# Patient Record
Sex: FEMALE | Race: WHITE | Employment: UNEMPLOYED | URBAN - METROPOLITAN AREA
[De-identification: names, ages, dates, MRNs, and addresses within clinical notes are randomized per-mention and may not be internally consistent; named-entity substitution may affect disease eponyms.]

---
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
## 2019-06-18 ENCOUNTER — HOSPITAL ENCOUNTER (EMERGENCY)
Age: 19
Discharge: ANOTHER HOSPITAL | End: 2019-06-19
Attending: EMERGENCY MEDICINE
Payer: OTHER GOVERNMENT

## 2019-06-18 ENCOUNTER — APPOINTMENT (OUTPATIENT)
Dept: CT IMAGING | Age: 19
End: 2019-06-18
Attending: EMERGENCY MEDICINE
Payer: OTHER GOVERNMENT

## 2019-06-18 DIAGNOSIS — R44.3 HALLUCINATIONS: Primary | ICD-10-CM

## 2019-06-18 DIAGNOSIS — R45.851 SUICIDAL IDEATION: ICD-10-CM

## 2019-06-18 LAB
AMPHET UR QL SCN: NEGATIVE
ANION GAP SERPL CALC-SCNC: 9 MMOL/L (ref 3–18)
APAP SERPL-MCNC: <2 UG/ML (ref 10–30)
APPEARANCE UR: ABNORMAL
BACTERIA URNS QL MICRO: ABNORMAL /HPF
BARBITURATES UR QL SCN: NEGATIVE
BASOPHILS # BLD: 0 K/UL (ref 0–0.1)
BASOPHILS NFR BLD: 0 % (ref 0–2)
BENZODIAZ UR QL: NEGATIVE
BILIRUB UR QL: NEGATIVE
BUN SERPL-MCNC: 15 MG/DL (ref 7–18)
BUN/CREAT SERPL: 13 (ref 12–20)
CALCIUM SERPL-MCNC: 8.8 MG/DL (ref 8.5–10.1)
CANNABINOIDS UR QL SCN: NEGATIVE
CHLORIDE SERPL-SCNC: 109 MMOL/L (ref 100–108)
CO2 SERPL-SCNC: 26 MMOL/L (ref 21–32)
COCAINE UR QL SCN: NEGATIVE
COLOR UR: YELLOW
CREAT SERPL-MCNC: 1.14 MG/DL (ref 0.6–1.3)
DIFFERENTIAL METHOD BLD: NORMAL
EOSINOPHIL # BLD: 0.1 K/UL (ref 0–0.4)
EOSINOPHIL NFR BLD: 2 % (ref 0–5)
EPITH CASTS URNS QL MICRO: ABNORMAL /LPF (ref 0–5)
ERYTHROCYTE [DISTWIDTH] IN BLOOD BY AUTOMATED COUNT: 11.7 % (ref 11.6–14.5)
ETHANOL SERPL-MCNC: <3 MG/DL (ref 0–3)
GLUCOSE SERPL-MCNC: 124 MG/DL (ref 74–99)
GLUCOSE UR STRIP.AUTO-MCNC: NEGATIVE MG/DL
HCG UR QL: NEGATIVE
HCT VFR BLD AUTO: 38.9 % (ref 35–45)
HDSCOM,HDSCOM: NORMAL
HGB BLD-MCNC: 13.3 G/DL (ref 12–16)
HGB UR QL STRIP: NEGATIVE
KETONES UR QL STRIP.AUTO: NEGATIVE MG/DL
LEUKOCYTE ESTERASE UR QL STRIP.AUTO: ABNORMAL
LYMPHOCYTES # BLD: 3.1 K/UL (ref 0.9–3.6)
LYMPHOCYTES NFR BLD: 46 % (ref 21–52)
MCH RBC QN AUTO: 31.1 PG (ref 24–34)
MCHC RBC AUTO-ENTMCNC: 34.2 G/DL (ref 31–37)
MCV RBC AUTO: 90.9 FL (ref 74–97)
METHADONE UR QL: NEGATIVE
MONOCYTES # BLD: 0.5 K/UL (ref 0.05–1.2)
MONOCYTES NFR BLD: 7 % (ref 3–10)
NEUTS SEG # BLD: 3.1 K/UL (ref 1.8–8)
NEUTS SEG NFR BLD: 45 % (ref 40–73)
NITRITE UR QL STRIP.AUTO: NEGATIVE
OPIATES UR QL: NEGATIVE
PCP UR QL: NEGATIVE
PH UR STRIP: 5 [PH] (ref 5–8)
PLATELET # BLD AUTO: 248 K/UL (ref 135–420)
PMV BLD AUTO: 10.2 FL (ref 9.2–11.8)
POTASSIUM SERPL-SCNC: 4 MMOL/L (ref 3.5–5.5)
PROT UR STRIP-MCNC: NEGATIVE MG/DL
RBC # BLD AUTO: 4.28 M/UL (ref 4.2–5.3)
RBC #/AREA URNS HPF: NEGATIVE /HPF (ref 0–5)
SALICYLATES SERPL-MCNC: <1.7 MG/DL (ref 2.8–20)
SODIUM SERPL-SCNC: 144 MMOL/L (ref 136–145)
SP GR UR REFRACTOMETRY: 1.02 (ref 1–1.03)
UROBILINOGEN UR QL STRIP.AUTO: 1 EU/DL (ref 0.2–1)
WBC # BLD AUTO: 6.8 K/UL (ref 4.6–13.2)
WBC URNS QL MICRO: ABNORMAL /HPF (ref 0–5)

## 2019-06-18 PROCEDURE — 70450 CT HEAD/BRAIN W/O DYE: CPT

## 2019-06-18 PROCEDURE — 80048 BASIC METABOLIC PNL TOTAL CA: CPT

## 2019-06-18 PROCEDURE — 85025 COMPLETE CBC W/AUTO DIFF WBC: CPT

## 2019-06-18 PROCEDURE — 81001 URINALYSIS AUTO W/SCOPE: CPT

## 2019-06-18 PROCEDURE — 81025 URINE PREGNANCY TEST: CPT

## 2019-06-18 PROCEDURE — 99285 EMERGENCY DEPT VISIT HI MDM: CPT

## 2019-06-18 PROCEDURE — 80307 DRUG TEST PRSMV CHEM ANLYZR: CPT

## 2019-06-18 RX ORDER — FLUOXETINE HYDROCHLORIDE 20 MG/1
20 CAPSULE ORAL
Status: ON HOLD | COMMUNITY
End: 2019-06-21 | Stop reason: SDUPTHER

## 2019-06-18 RX ORDER — ESCITALOPRAM OXALATE 20 MG/1
20 TABLET ORAL DAILY
Status: ON HOLD | COMMUNITY
End: 2019-06-20

## 2019-06-18 RX ORDER — TRAZODONE HYDROCHLORIDE 100 MG/1
100 TABLET ORAL
Status: ON HOLD | COMMUNITY
End: 2019-06-20

## 2019-06-18 RX ORDER — BUTALBITAL, ACETAMINOPHEN AND CAFFEINE 50; 325; 40 MG/1; MG/1; MG/1
1 TABLET ORAL
Status: DISPENSED | OUTPATIENT
Start: 2019-06-18 | End: 2019-06-19

## 2019-06-19 ENCOUNTER — HOSPITAL ENCOUNTER (INPATIENT)
Age: 19
LOS: 2 days | Discharge: HOME | DRG: 885 | End: 2019-06-21
Attending: PSYCHIATRY & NEUROLOGY | Admitting: PSYCHIATRY & NEUROLOGY
Payer: OTHER GOVERNMENT

## 2019-06-19 VITALS
OXYGEN SATURATION: 98 % | RESPIRATION RATE: 16 BRPM | WEIGHT: 185 LBS | DIASTOLIC BLOOD PRESSURE: 55 MMHG | BODY MASS INDEX: 27.4 KG/M2 | HEIGHT: 69 IN | HEART RATE: 86 BPM | TEMPERATURE: 97.9 F | SYSTOLIC BLOOD PRESSURE: 114 MMHG

## 2019-06-19 PROBLEM — F32.A DEPRESSION: Status: ACTIVE | Noted: 2019-06-19

## 2019-06-19 PROCEDURE — 65220000003 HC RM SEMIPRIVATE PSYCH

## 2019-06-19 PROCEDURE — 74011250637 HC RX REV CODE- 250/637: Performed by: PSYCHIATRY & NEUROLOGY

## 2019-06-19 RX ORDER — FLUOXETINE HYDROCHLORIDE 20 MG/1
20 CAPSULE ORAL DAILY
Status: DISCONTINUED | OUTPATIENT
Start: 2019-06-19 | End: 2019-06-19 | Stop reason: HOSPADM

## 2019-06-19 RX ORDER — BENZTROPINE MESYLATE 1 MG/ML
2 INJECTION INTRAMUSCULAR; INTRAVENOUS
Status: DISCONTINUED | OUTPATIENT
Start: 2019-06-19 | End: 2019-06-21 | Stop reason: HOSPADM

## 2019-06-19 RX ORDER — IBUPROFEN 400 MG/1
400 TABLET ORAL
Status: DISCONTINUED | OUTPATIENT
Start: 2019-06-19 | End: 2019-06-21 | Stop reason: HOSPADM

## 2019-06-19 RX ORDER — TRAZODONE HYDROCHLORIDE 50 MG/1
50 TABLET ORAL
Status: DISCONTINUED | OUTPATIENT
Start: 2019-06-19 | End: 2019-06-21 | Stop reason: HOSPADM

## 2019-06-19 RX ORDER — LORAZEPAM 2 MG/ML
2 INJECTION INTRAMUSCULAR
Status: DISCONTINUED | OUTPATIENT
Start: 2019-06-19 | End: 2019-06-21 | Stop reason: HOSPADM

## 2019-06-19 RX ORDER — BENZTROPINE MESYLATE 2 MG/1
2 TABLET ORAL
Status: DISCONTINUED | OUTPATIENT
Start: 2019-06-19 | End: 2019-06-21 | Stop reason: HOSPADM

## 2019-06-19 RX ORDER — ACETAMINOPHEN 325 MG/1
650 TABLET ORAL
Status: DISCONTINUED | OUTPATIENT
Start: 2019-06-19 | End: 2019-06-21 | Stop reason: HOSPADM

## 2019-06-19 RX ORDER — ESCITALOPRAM OXALATE 10 MG/1
20 TABLET ORAL DAILY
Status: DISCONTINUED | OUTPATIENT
Start: 2019-06-19 | End: 2019-06-19 | Stop reason: HOSPADM

## 2019-06-19 RX ORDER — ADHESIVE BANDAGE
30 BANDAGE TOPICAL DAILY PRN
Status: DISCONTINUED | OUTPATIENT
Start: 2019-06-19 | End: 2019-06-21 | Stop reason: HOSPADM

## 2019-06-19 RX ORDER — OLANZAPINE 5 MG/1
5 TABLET ORAL
Status: DISCONTINUED | OUTPATIENT
Start: 2019-06-19 | End: 2019-06-21 | Stop reason: HOSPADM

## 2019-06-19 RX ORDER — HYDROXYZINE 25 MG/1
50 TABLET, FILM COATED ORAL
Status: DISCONTINUED | OUTPATIENT
Start: 2019-06-19 | End: 2019-06-21 | Stop reason: HOSPADM

## 2019-06-19 RX ORDER — IBUPROFEN 200 MG
1 TABLET ORAL
Status: DISCONTINUED | OUTPATIENT
Start: 2019-06-19 | End: 2019-06-21 | Stop reason: HOSPADM

## 2019-06-19 RX ADMIN — TRAZODONE HYDROCHLORIDE 50 MG: 50 TABLET ORAL at 22:08

## 2019-06-19 NOTE — ED PROVIDER NOTES
EMERGENCY DEPARTMENT HISTORY AND PHYSICAL EXAM    Date: 6/18/2019  Patient Name: Lily Montiel    History of Presenting Illness     Chief Complaint   Patient presents with    Mental Health Problem         History Provided By: Patient    Additional History (Context):   Lily Montiel is a 20 y.o. female with PMHX of depression, prior suicidal ideations, admitted to a psychiatric hospital 3 weeks ago presents to the emergency department C/O visual and auditory hallucinations that started at 3 PM.  States that the visual hallucinations involve \"things crawling out of the walls\". States that the visual hallucinations since has improved. Also reports auditory hallucinations. She is unsure if it is a male or a woman talking to her. Reports that they are telling her to \"kill myself\". She is concerned that the hallucinations are due to her starting her psychiatric medications including trazodone, Lexapro, Zyprexa . patient also reports generalized headache over last week. States that she has not tried anything for her headache. Pt denies numbness, weakness, blurry vision, and any other sxs or complaints. PCP: Manolo Garber MD    Current Outpatient Medications   Medication Sig Dispense Refill    FLUoxetine (PROZAC) 20 mg capsule Take 20 mg by mouth daily.  escitalopram oxalate (LEXAPRO) 20 mg tablet Take 20 mg by mouth daily.  traZODone (DESYREL) 100 mg tablet Take 100 mg by mouth nightly. Past History     Past Medical History:  Past Medical History:   Diagnosis Date    Anxiety     Asthma     As a child, states no longer active    Depression        Past Surgical History:  Past Surgical History:   Procedure Laterality Date    HX ADENOIDECTOMY      MYRINGOPLASTY         Family History:  History reviewed. No pertinent family history.     Social History:  Social History     Tobacco Use    Smoking status: Never Smoker    Smokeless tobacco: Never Used   Substance Use Topics    Alcohol use: No    Drug use: No       Allergies:  No Known Allergies      Review of Systems   Review of Systems   Constitutional: Negative for chills and fever. HENT: Negative for congestion, ear pain, sinus pain and sore throat. Eyes: Negative for pain and visual disturbance. Respiratory: Negative for cough and shortness of breath. Cardiovascular: Negative for chest pain and leg swelling. Gastrointestinal: Negative for abdominal pain, constipation, diarrhea, nausea and vomiting. Genitourinary: Negative for dysuria, hematuria, vaginal bleeding and vaginal discharge. Musculoskeletal: Negative for back pain and neck pain. Skin: Negative for rash and wound. Neurological: Positive for headaches. Negative for dizziness, tremors, weakness, light-headedness and numbness. Psychiatric/Behavioral: Positive for hallucinations and suicidal ideas. All other systems reviewed and are negative.       Physical Exam     Vitals:    06/18/19 1951   BP: 120/54   Pulse: 86   Resp: 18   Temp: 98.4 °F (36.9 °C)   SpO2: 100%   Weight: 83.9 kg (185 lb)   Height: 5' 9\" (1.753 m)     Physical Exam    Nursing note and vitals reviewed    Constitutional: Well appearing young  female, no acute distress  Head: Normocephalic, Atraumatic  Eyes: Pupils are equal, round, and reactive to light, EOMI  Neck: Supple, non-tender  Cardiovascular: Regular rate and rhythm, no murmurs, rubs, or gallops  Chest: Normal work of breathing and chest excursion bilaterally  Lungs: Clear to ausculation bilaterally, no wheezes, no rhonchi  Abdomen: Soft, non tender, non distended, normoactive bowel sounds  Back: No evidence of trauma or deformity  Extremities: No evidence of trauma or deformity, no LE edema  Skin: Warm and dry, normal cap refill  Neuro: Alert and appropriate, CN intact, normal speech, moving all 4 extremities freely and symmetrically  Psychiatric: Normal mood and affect       Diagnostic Study Results     Labs -   No results found for this or any previous visit (from the past 12 hour(s)). Radiologic Studies -   No orders to display     CT Results  (Last 48 hours)    None        CXR Results  (Last 48 hours)    None            Medical Decision Making   I am the first provider for this patient. I reviewed the vital signs, available nursing notes, past medical history, past surgical history, family history and social history. Vital Signs-Reviewed the patient's vital signs. Pulse Oximetry Analysis -100 % on room air    Records Reviewed: Nursing Notes and Old Medical Records    Provider Notes:   20 y.o. female presenting with visual and auditory hallucinations. On exam, patient exhibited appropriate VS, non toxic and NAD. Will obtain screening labwork including UDS, ethyl alcohol and consult psych for further recommendations. Due to her recent headache, and unusual visual hallucinations, will obtain a CT scan of the head. We will treat her headache symptomatically. Procedures:  Procedures    ED Course:   8:02 PM   Initial assessment performed. The patients presenting problems have been discussed, and they are in agreement with the care plan formulated and outlined with them. I have encouraged them to ask questions as they arise throughout their visit. ED Course as of Jun 19 0151   Tue Jun 18, 2019   2116 Patient medically cleared. UA not consistent with UTI. CT scan showing incidental finding of arachnoid cyst.  No acute process. Will consult case management. [CA]      ED Course User Index  [CA] Monie Ching,      7:00 AM  Patient's presentation, labs/imaging and plan of care was reviewed with Dr. Edu Arce as part of sign out. They will review case management placement as part of the plan discussed with the patient. Dr. Edu Arce MD's assistance in completion of this plan is greatly appreciated but it should be noted that I will be the provider of record for this patient.     8:35 AM  Patient reports that she currently is no longer having hallucinations and she is resting comfortably in her bed    9:06 AM  Updated patient on plan for transfer to [facility].  All questions answered. Patient understands and agrees with this plan. Diagnosis and Disposition     7:00 AM  I have spent 40 minutes of critical care time involved in lab review, consultations with specialist, family decision-making, and documentation. During this entire length of time I was immediately available to the patient. Critical Care: The reason for providing this level of medical care for this critically ill patient was due a critical illness that impaired one or more vital organ systems such that there was a high probability of imminent or life threatening deterioration in the patients condition. This care involved high complexity decision making to assess, manipulate, and support vital system functions, to treat this degreee vital organ system failure and to prevent further life threatening deterioration of the patients condition. TRANSFER PROGRESS NOTE:    Discussed impending transfer with Patient and/or family. Pt and/or family instructed that Pt would be transferred to [facility].  Discussed reasoning for transfer and future treatment plan. Family and Pt understands and agrees with care plan.   Written by Rae Ching DO,    Labs Reviewed   URINALYSIS W/ RFLX MICROSCOPIC - Abnormal; Notable for the following components:       Result Value    Leukocyte Esterase SMALL (*)     All other components within normal limits   METABOLIC PANEL, BASIC - Abnormal; Notable for the following components:    Chloride 109 (*)     Glucose 124 (*)     All other components within normal limits   ACETAMINOPHEN - Abnormal; Notable for the following components:    Acetaminophen level <2 (*)     All other components within normal limits   SALICYLATE - Abnormal; Notable for the following components: Salicylate level <5.7 (*)     All other components within normal limits   URINE MICROSCOPIC ONLY - Abnormal; Notable for the following components:    Bacteria 1+ (*)     All other components within normal limits   DRUG SCREEN, URINE   CBC WITH AUTOMATED DIFF   ETHYL ALCOHOL   HCG URINE, QL. - POC       Recent Results (from the past 12 hour(s))   URINALYSIS W/ RFLX MICROSCOPIC    Collection Time: 06/18/19  8:29 PM   Result Value Ref Range    Color YELLOW      Appearance CLOUDY      Specific gravity 1.024 1.005 - 1.030      pH (UA) 5.0 5.0 - 8.0      Protein NEGATIVE  NEG mg/dL    Glucose NEGATIVE  NEG mg/dL    Ketone NEGATIVE  NEG mg/dL    Bilirubin NEGATIVE  NEG      Blood NEGATIVE  NEG      Urobilinogen 1.0 0.2 - 1.0 EU/dL    Nitrites NEGATIVE  NEG      Leukocyte Esterase SMALL (A) NEG     DRUG SCREEN, URINE    Collection Time: 06/18/19  8:29 PM   Result Value Ref Range    BENZODIAZEPINES NEGATIVE  NEG      BARBITURATES NEGATIVE  NEG      THC (TH-CANNABINOL) NEGATIVE  NEG      OPIATES NEGATIVE  NEG      PCP(PHENCYCLIDINE) NEGATIVE  NEG      COCAINE NEGATIVE  NEG      AMPHETAMINES NEGATIVE  NEG      METHADONE NEGATIVE  NEG      HDSCOM (NOTE)    URINE MICROSCOPIC ONLY    Collection Time: 06/18/19  8:29 PM   Result Value Ref Range    WBC 0 to 3 0 - 5 /hpf    RBC NEGATIVE  0 - 5 /hpf    Epithelial cells 1+ 0 - 5 /lpf    Bacteria 1+ (A) NEG /hpf   CBC WITH AUTOMATED DIFF    Collection Time: 06/18/19  8:30 PM   Result Value Ref Range    WBC 6.8 4.6 - 13.2 K/uL    RBC 4.28 4.20 - 5.30 M/uL    HGB 13.3 12.0 - 16.0 g/dL    HCT 38.9 35.0 - 45.0 %    MCV 90.9 74.0 - 97.0 FL    MCH 31.1 24.0 - 34.0 PG    MCHC 34.2 31.0 - 37.0 g/dL    RDW 11.7 11.6 - 14.5 %    PLATELET 901 078 - 769 K/uL    MPV 10.2 9.2 - 11.8 FL    NEUTROPHILS 45 40 - 73 %    LYMPHOCYTES 46 21 - 52 %    MONOCYTES 7 3 - 10 %    EOSINOPHILS 2 0 - 5 %    BASOPHILS 0 0 - 2 %    ABS. NEUTROPHILS 3.1 1.8 - 8.0 K/UL    ABS. LYMPHOCYTES 3.1 0.9 - 3.6 K/UL    ABS. MONOCYTES 0.5 0.05 - 1.2 K/UL    ABS. EOSINOPHILS 0.1 0.0 - 0.4 K/UL    ABS. BASOPHILS 0.0 0.0 - 0.1 K/UL    DF AUTOMATED     METABOLIC PANEL, BASIC    Collection Time: 06/18/19  8:30 PM   Result Value Ref Range    Sodium 144 136 - 145 mmol/L    Potassium 4.0 3.5 - 5.5 mmol/L    Chloride 109 (H) 100 - 108 mmol/L    CO2 26 21 - 32 mmol/L    Anion gap 9 3.0 - 18 mmol/L    Glucose 124 (H) 74 - 99 mg/dL    BUN 15 7.0 - 18 MG/DL    Creatinine 1.14 0.6 - 1.3 MG/DL    BUN/Creatinine ratio 13 12 - 20      GFR est AA >60 >60 ml/min/1.73m2    GFR est non-AA >60 >60 ml/min/1.73m2    Calcium 8.8 8.5 - 10.1 MG/DL   ACETAMINOPHEN    Collection Time: 06/18/19  8:30 PM   Result Value Ref Range    Acetaminophen level <2 (L) 10.0 - 23.5 ug/mL   SALICYLATE    Collection Time: 06/18/19  8:30 PM   Result Value Ref Range    Salicylate level <7.5 (L) 2.8 - 20.0 MG/DL   ETHYL ALCOHOL    Collection Time: 06/18/19  8:30 PM   Result Value Ref Range    ALCOHOL(ETHYL),SERUM <3 0 - 3 MG/DL   HCG URINE, QL. - POC    Collection Time: 06/18/19  8:45 PM   Result Value Ref Range    Pregnancy test,urine (POC) NEGATIVE  NEG         CLINICAL IMPRESSION    1. Hallucinations    2. Suicidal ideation              ____________________________________     Please note that this dictation was completed with [employer], the computer voice recognition software. Quite often unanticipated grammatical, syntax, homophones, and other interpretive errors are inadvertently transcribed by the computer software. Please disregard these errors. Please excuse any errors that have escaped final proofreading.

## 2019-06-19 NOTE — ED NOTES
Pt resting in bed comfortably, lights dimmed to encourage relaxation  Call bell within reach  Pt hourly rounding competed. Safety   Pt (*) resting on stretcher with side rails up and call bell in reach. () in chair    () in parents arms. Toileting   Pt offered ()Bedpan     (*)Assistance to Restroom     ()Urinal  Ongoing Updates  Updated on plan of care and status of test results.   Pain Management  Inquired as to comfort and offered comfort measures:    (*) warm blankets   (*) dimmed lights

## 2019-06-19 NOTE — H&P
History and Physical    Subjective:     Omar Garcia is a 20 y.o. female with past medical hx significant for Asthma which is nonpersistent, anxiety, Depression. Per ER documentation, Pt admitted to a psychiatric hospital 3 weeks ago presents to the emergency department C/O visual and auditory hallucinations that started at 3 PM.  States that the visual hallucinations involve \"things crawling out of the walls\". States that the visual hallucinations since has improved. Also reports auditory hallucinations. She is unsure if it is a male or a woman talking to her. Reports that they are telling her to \"kill myself\". She is concerned that the hallucinations are due to her starting her psychiatric medications including trazodone, Lexapro, Zyprexa . patient also reports generalized headache over last week. States that she has not tried anything for her headache. Pt denies numbness, weakness, blurry vision, and any other sxs or complaints. Pt denies any complaints. Showing no signs of acute distress. Pt is c/o headache which she calls cluster headaches. Past Medical History:   Diagnosis Date    Anxiety     Asthma     As a child, states no longer active    Depression       Past Surgical History:   Procedure Laterality Date    HX ADENOIDECTOMY      MYRINGOPLASTY       FHX:HTN, Breast cancer    Social History     Tobacco Use    Smoking status: Never Smoker    Smokeless tobacco: Never Used   Substance Use Topics    Alcohol use: No       Prior to Admission medications    Medication Sig Start Date End Date Taking? Authorizing Provider   FLUoxetine (PROZAC) 20 mg capsule Take 20 mg by mouth daily. Manolo Garber MD   escitalopram oxalate (LEXAPRO) 20 mg tablet Take 20 mg by mouth daily. Manolo Garber MD   traZODone (DESYREL) 100 mg tablet Take 100 mg by mouth nightly.     Manolo Garber MD     No Known Allergies     Review of Systems:  Constitutional: negative  Eyes: negative  Ears, Nose, Mouth, Throat, and Face: negative  Respiratory: negative  Cardiovascular: negative  Gastrointestinal: negative  Genitourinary:negative  Integument/Breast: negative  Hematologic/Lymphatic: negative  Musculoskeletal:negative  Neurological: Headache  Behavioral/Psychiatric:depression. Endocrine: negative  Allergic/Immunologic: negative     Objective: Intake and Output:    No intake/output data recorded. No intake/output data recorded. Physical Exam:   Visit Vitals  /59 (BP Patient Position: At rest)   Pulse 64   Temp 98.2 °F (36.8 °C)   Resp 16   Ht 5' 9\" (1.753 m)   Wt 88.5 kg (195 lb)   SpO2 96%   BMI 28.80 kg/m²     General:  Alert, cooperative, no distress, appears stated age. Head:  Normocephalic, without obvious abnormality, atraumatic. Eyes:  Conjunctivae/corneas clear. PERRL, EOMs intact. Ears:  Normal external ear canals both ears. Nose: Nares normal. Septum midline. Mucosa normal. No drainage or sinus tenderness. Throat: Lips, mucosa, and tongue normal. Teeth and gums normal.   Neck: Supple, symmetrical, trachea midline, no adenopathy, thyroid: no enlargement/tenderness/nodules. Back:   Symmetric, no curvature. ROM normal. No CVA tenderness. Lungs:   Clear to auscultation bilaterally. Chest wall:  No tenderness or deformity. Heart:  Regular rate and rhythm, S1, S2 normal, no murmur, click, rub or gallop. Abdomen:   Soft, non-tender. Bowel sounds normal. No masses,  No organomegaly. Extremities: Extremities normal, atraumatic, no cyanosis or edema. Pulses: Distal pulses intact. Skin: Skin color, texture, turgor normal. No rashes or lesions   Lymph nodes: No cervical or supraclavicular adenopathy. Neurologic: CNII-XII intact. Normal strength, sensation inatact, reflexes 2/4 patellar region.        Data Review:   Recent Results (from the past 24 hour(s))   URINALYSIS W/ RFLX MICROSCOPIC    Collection Time: 06/18/19  8:29 PM   Result Value Ref Range    Color YELLOW      Appearance CLOUDY      Specific gravity 1.024 1.005 - 1.030      pH (UA) 5.0 5.0 - 8.0      Protein NEGATIVE  NEG mg/dL    Glucose NEGATIVE  NEG mg/dL    Ketone NEGATIVE  NEG mg/dL    Bilirubin NEGATIVE  NEG      Blood NEGATIVE  NEG      Urobilinogen 1.0 0.2 - 1.0 EU/dL    Nitrites NEGATIVE  NEG      Leukocyte Esterase SMALL (A) NEG     DRUG SCREEN, URINE    Collection Time: 06/18/19  8:29 PM   Result Value Ref Range    BENZODIAZEPINES NEGATIVE  NEG      BARBITURATES NEGATIVE  NEG      THC (TH-CANNABINOL) NEGATIVE  NEG      OPIATES NEGATIVE  NEG      PCP(PHENCYCLIDINE) NEGATIVE  NEG      COCAINE NEGATIVE  NEG      AMPHETAMINES NEGATIVE  NEG      METHADONE NEGATIVE  NEG      HDSCOM (NOTE)    URINE MICROSCOPIC ONLY    Collection Time: 06/18/19  8:29 PM   Result Value Ref Range    WBC 0 to 3 0 - 5 /hpf    RBC NEGATIVE  0 - 5 /hpf    Epithelial cells 1+ 0 - 5 /lpf    Bacteria 1+ (A) NEG /hpf   CBC WITH AUTOMATED DIFF    Collection Time: 06/18/19  8:30 PM   Result Value Ref Range    WBC 6.8 4.6 - 13.2 K/uL    RBC 4.28 4.20 - 5.30 M/uL    HGB 13.3 12.0 - 16.0 g/dL    HCT 38.9 35.0 - 45.0 %    MCV 90.9 74.0 - 97.0 FL    MCH 31.1 24.0 - 34.0 PG    MCHC 34.2 31.0 - 37.0 g/dL    RDW 11.7 11.6 - 14.5 %    PLATELET 383 970 - 804 K/uL    MPV 10.2 9.2 - 11.8 FL    NEUTROPHILS 45 40 - 73 %    LYMPHOCYTES 46 21 - 52 %    MONOCYTES 7 3 - 10 %    EOSINOPHILS 2 0 - 5 %    BASOPHILS 0 0 - 2 %    ABS. NEUTROPHILS 3.1 1.8 - 8.0 K/UL    ABS. LYMPHOCYTES 3.1 0.9 - 3.6 K/UL    ABS. MONOCYTES 0.5 0.05 - 1.2 K/UL    ABS. EOSINOPHILS 0.1 0.0 - 0.4 K/UL    ABS.  BASOPHILS 0.0 0.0 - 0.1 K/UL    DF AUTOMATED     METABOLIC PANEL, BASIC    Collection Time: 06/18/19  8:30 PM   Result Value Ref Range    Sodium 144 136 - 145 mmol/L    Potassium 4.0 3.5 - 5.5 mmol/L    Chloride 109 (H) 100 - 108 mmol/L    CO2 26 21 - 32 mmol/L    Anion gap 9 3.0 - 18 mmol/L    Glucose 124 (H) 74 - 99 mg/dL    BUN 15 7.0 - 18 MG/DL    Creatinine 1.14 0.6 - 1.3 MG/DL BUN/Creatinine ratio 13 12 - 20      GFR est AA >60 >60 ml/min/1.73m2    GFR est non-AA >60 >60 ml/min/1.73m2    Calcium 8.8 8.5 - 10.1 MG/DL   ACETAMINOPHEN    Collection Time: 06/18/19  8:30 PM   Result Value Ref Range    Acetaminophen level <2 (L) 10.0 - 81.5 ug/mL   SALICYLATE    Collection Time: 06/18/19  8:30 PM   Result Value Ref Range    Salicylate level <5.3 (L) 2.8 - 20.0 MG/DL   ETHYL ALCOHOL    Collection Time: 06/18/19  8:30 PM   Result Value Ref Range    ALCOHOL(ETHYL),SERUM <3 0 - 3 MG/DL   HCG URINE, QL. - POC    Collection Time: 06/18/19  8:45 PM   Result Value Ref Range    Pregnancy test,urine (POC) NEGATIVE  NEG         Assessment:     Depression    HA    Asthma    Plan:     Proventil inhaler    No VTE prophylaxis indicated or necessary at this time.    Signed By: Kait Mancini MD     June 19, 2019

## 2019-06-19 NOTE — PROGRESS NOTES
Return call received from [address] admissions spoke with Jerson Urena, pt accepted to [facility] [address] [facility], [address] accepting physician is Elizabeth Doherty report can be called to [facility] [phone], accepting physician request for prozac and lexapro be given prior to pt discharge, made aware.

## 2019-06-19 NOTE — ED NOTES
Pt resting on stretcher with mother at bedside. Pt offered Fioricet for headache, pt declined. Offered pt tylenol or Motrin instead, pt states that she does not want any medication at this time. Dr. Trudy Rucker with pt and pt mother discussing plan of care. Pt states that she is voluntary admission for psychiatric assessment.

## 2019-06-19 NOTE — ED NOTES
The documentation for this period is being entered following the guidelines as defined in the [address] downtime policy by Brinda Love RN.

## 2019-06-19 NOTE — ED NOTES
Assumed care of pt;  Pt lying on stretcher with eyes closed; Pt in NAD;  Sad scale reevaluated;  Sitter at bedside;   Pt cooperative and call bell within reach

## 2019-06-19 NOTE — ED NOTES
Pt hourly rounding competed. Safety   Pt (x) resting on stretcher with side rails up and call bell in reach. () in chair    () in parents arms. Toileting   Pt offered ()Bedpan     ()Assistance to Restroom     ()Urinal  Ongoing Updates  Updated on plan of care and status of test results.   Pain Management  Inquired as to comfort and offered comfort measures:    (x) warm blankets   (x) dimmed lights  Sitter remains at bedside

## 2019-06-19 NOTE — ED NOTES
Pt appears to be sleeping, chest rise and fall notes and pt with occasional movements. Sitter remains at bedside observing pt and ensuring safety.
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
## 2019-06-19 NOTE — ED NOTES
Patient transported via [address] [place] [address],[address] with all belongings.  All questions answered prior to transfer

## 2019-06-20 VITALS
OXYGEN SATURATION: 99 % | HEIGHT: 69 IN | TEMPERATURE: 98.4 F | WEIGHT: 195 LBS | SYSTOLIC BLOOD PRESSURE: 97 MMHG | HEART RATE: 70 BPM | BODY MASS INDEX: 28.88 KG/M2 | DIASTOLIC BLOOD PRESSURE: 44 MMHG | RESPIRATION RATE: 16 BRPM

## 2019-06-20 PROCEDURE — 65220000003 HC RM SEMIPRIVATE PSYCH

## 2019-06-20 PROCEDURE — 74011250637 HC RX REV CODE- 250/637: Performed by: PSYCHIATRY & NEUROLOGY

## 2019-06-20 RX ORDER — ALBUTEROL SULFATE 90 UG/1
2 AEROSOL, METERED RESPIRATORY (INHALATION)
Status: DISCONTINUED | OUTPATIENT
Start: 2019-06-20 | End: 2019-06-21 | Stop reason: HOSPADM

## 2019-06-20 RX ORDER — ALBUTEROL SULFATE 90 UG/1
2 AEROSOL, METERED RESPIRATORY (INHALATION)
Status: ON HOLD | COMMUNITY
End: 2019-06-21 | Stop reason: SDUPTHER

## 2019-06-20 RX ORDER — OLANZAPINE 2.5 MG/1
2.5 TABLET ORAL
Status: ON HOLD | COMMUNITY
End: 2019-06-21 | Stop reason: SDUPTHER

## 2019-06-20 RX ORDER — TRAZODONE HYDROCHLORIDE 50 MG/1
50 TABLET ORAL
Status: ON HOLD | COMMUNITY
End: 2019-06-21 | Stop reason: SDUPTHER

## 2019-06-20 RX ADMIN — HYDROXYZINE HYDROCHLORIDE 50 MG: 25 TABLET, FILM COATED ORAL at 21:47

## 2019-06-20 RX ADMIN — TRAZODONE HYDROCHLORIDE 50 MG: 50 TABLET ORAL at 21:46

## 2019-06-20 NOTE — BH NOTES
PSYCHOSOCIAL ASSESSMENT  :Patient identifying info:  Jelani Antunez is a 20 y.o., female admitted 6/19/2019 12:53 PM     Presenting problem and precipitating factors: Pt reported she came into the ER because she was having headaches and hearing voices. Pt reported she has never had any experience of hearing voices. Per pt 3 weeks ago she overdosed  and was hospitalized for about 5 days. Pt reported she was stressed with her work schedule and hostile work environment. She was started on new medications during this time. Pt reported hat she was cleaning with bleach in a small bathroom , no long after did the voices start. Mental status assessment: Pt was alert and oriented. Pt denies SI/HI. Pt is free of delusions. Pt's thought process was logical. Pt's insight and judgment was fair, reliability was good. Strengths: Pt is goal oriented     Collateral information: Message was left for the pt's mother [facility] [phone]    Current psychiatric /substance abuse providers and contact info: No current outpatient provider     Previous psychiatric/substance abuse providers and response to treatment: Pt was hospitalized 3 weeks ago mental health facility [place], [place]    Family history of mental illness or substance abuse: Pt's mother suffers from depression     Substance abuse history:    Social History     Tobacco Use    Smoking status: Never Smoker    Smokeless tobacco: Never Used   Substance Use Topics    Alcohol use: No       History of biomedical complications associated with substance abuse : N/A     Patient's current acceptance of treatment or motivation for change: Pt willing to stay for observation and continue with medications. Family constellation: Pt's mother and step-father are involved in her life     Is significant other involved?  Pt's fiance is supportive of her treatment       Describe support system: Pt identified her parents and fiance as her supports     Describe living arrangements and home environment: Pt living with her family and fiance. Pt and her fiance will be moving to [facility] this weekend. 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 She graduated from [employer] in May.      Have you been licensed as a health care professional (current or ): Pt is not a licensed professional     Leisure and recreation preferences: Pt enjoys spending time with family     Describe coping skills: Stephen Hoffmann  2019

## 2019-06-20 NOTE — BH NOTES
GROUP THERAPY PROGRESS NOTE    The patient Rama smith 20 y.o. female is participating in Coping Skills Group. Group time: 45 minutes    Personal goal for participation: To participate in healthy relationships bingo game    Goal orientation:  personal    Group therapy participation: active    Therapeutic interventions reviewed and discussed: things pertaining to relationships    Impression of participation:  The patient was attentive.     Ole Bonilla  6/20/2019  6:12 PM

## 2019-06-20 NOTE — BH NOTES
Report rec'd from Pauline Hess RN at 0700. Patient currently in bed sleeping. 7020  Patient is eating breakfast in the dayroom    0955  Patient in dayroom attending group    [phone]  Patient in dayroom coloring    1143  Patient in dayroom coloring    1228 Patient in dayroom eating lunch    1345  Patient in room resting    1455  Patient in bed reading    [id number]  Patient in bed reading    [phone]  Patient isolative to room today reading books. Patient did leave room for meals. Patient calm and cooperative, not currently on any medications. Patient states she is ready for discharge, possibly discharging tomorrow.           Rigoberto Reece

## 2019-06-20 NOTE — H&P
INITIAL PSYCHIATRIC EVALUATION            IDENTIFICATION:    Patient Name  Randolph Carranza   Date of Birth 2000   [facility] [id number]   Medical Record Number  [id number]      Age  20 y.o. PCP None   Admit date:  6/19/2019    Room Number  320/02  @ [facility]   Date of Service  6/20/2019            HISTORY         REASON FOR HOSPITALIZATION:  CC: \"I had a headache and was freaked out. \". Pt admitted under a voluntary basis for severe psychosis proving an inability to care for self. HISTORY OF PRESENT ILLNESS:    The patient, Randolph Carranza, is a 20 y.o. WHITE OR  female with a past psychiatric history significant for depression, who presents at this time with complaints of (and/or evidence of) the following emotional symptoms: depression, psychosis and seeing and hearing things. Additional symptomatology include was stress and anxiety, that have been present for days. These symptoms are of moderate severity and are intermittent/ fleeting in nature. Precipitants include exposure to high concentrations of bleach cleanser and psychosocial stressors (preparing to moving). Patient's condition made worse by recent medication additions where she missed one dose of zyprexa for  treatment noncompliance. UDS: negative; BAL=0. ALLERGIES: No Known Allergies   MEDICATIONS PRIOR TO ADMISSION:   Medications Prior to Admission   Medication Sig    OLANZapine (ZYPREXA) 2.5 mg tablet Take 2.5 mg by mouth nightly. Indications: Additional Medications to Treat Depression    traZODone (DESYREL) 50 mg tablet Take 50 mg by mouth nightly. Indications: insomnia associated with depression    albuterol (PROAIR HFA) 90 mcg/actuation inhaler Take 2 Puffs by inhalation every four (4) hours as needed for Wheezing or Shortness of Breath.  FLUoxetine (PROZAC) 20 mg capsule Take 20 mg by mouth nightly.  Indications: major depressive disorder      PAST MEDICAL HISTORY:   Past Medical History:   Diagnosis Date    Anxiety     Asthma     As a child, states no longer active    Depression      Past Surgical History:   Procedure Laterality Date    HX ADENOIDECTOMY      MYRINGOPLASTY        SOCIAL HISTORY:  ( is in the [employer]), No children. Social History     Socioeconomic History    Marital status: SINGLE     Spouse name: Not on file    Number of children: Not on file    Years of education: Not on file    Highest education level: Not on file   Occupational History    Not on file   Social Needs    Financial resource strain: Not on file    Food insecurity:     Worry: Not on file     Inability: Not on file    Transportation needs:     Medical: Not on file     Non-medical: Not on file   Tobacco Use    Smoking status: Never Smoker    Smokeless tobacco: Never Used   Substance and Sexual Activity    Alcohol use: No    Drug use: No    Sexual activity: Not on file   Lifestyle    Physical activity:     Days per week: Not on file     Minutes per session: Not on file    Stress: Not on file   Relationships    Social connections:     Talks on phone: Not on file     Gets together: Not on file     Attends [employer] service: Not on file     Active member of club or organization: Not on file     Attends meetings of clubs or organizations: Not on file     Relationship status: Not on file    Intimate partner violence:     Fear of current or ex partner: Not on file     Emotionally abused: Not on file     Physically abused: Not on file     Forced sexual activity: Not on file   Other Topics Concern    Not on file   Social History Narrative    Not on file      FAMILY HISTORY: History reviewed. No pertinent family history. No family history on file. REVIEW OF SYSTEMS:   History obtained from chart review and the patient  Pertinent items are noted in the History of Present Illness. All other Systems reviewed and are considered negative.            MENTAL STATUS EXAM & VITALS     MENTAL STATUS EXAM (MSE): MSE FINDINGS ARE WITHIN NORMAL LIMITS (WNL) UNLESS OTHERWISE STATED BELOW. ( ALL OF THE BELOW CATEGORIES OF THE MSE HAVE BEEN REVIEWED (reviewed 6/20/2019) AND UPDATED AS DEEMED APPROPRIATE )  General Presentation age appropriate, cooperative   Orientation oriented to time, place and person   Vital Signs  See below (reviewed 6/20/2019); Vital Signs (BP, Pulse, & Temp) are within normal limits if not listed below. Gait and Station Stable/steady, no ataxia   Musculoskeletal System No extrapyramidal symptoms (EPS); no abnormal muscular movements or Tardive Dyskinesia (TD); muscle strength and tone are within normal limits   Language No aphasia or dysarthria   Speech:  normal pitch, normal volume and non-pressured   Thought Processes logical; good abstract reasoning/computation   Thought Associations goal directed   Thought Content free of delusions and free of hallucinations   Suicidal Ideations none   Homicidal Ideations none   Mood:  euthymic   Affect:  euthymic   Memory recent  intact   Memory remote:  intact   Concentration/Attention:  adequate   Fund of Knowledge average   Insight:  good   Reliability good   Judgment:  good          VITALS:     Patient Vitals for the past 24 hrs:   Temp Pulse Resp BP SpO2   06/19/19 1900 98.3 °F (36.8 °C) (!) 53 16 90/50 97 %   06/19/19 1300 98.2 °F (36.8 °C) 64 16 102/59 96 %     Wt Readings from Last 3 Encounters:   06/19/19 88.5 kg (195 lb)   06/18/19 83.9 kg (185 lb)   10/26/18 96 kg (211 lb 10.3 oz) (98 %, Z= 2.11)*     * Growth percentiles are based on CDC (Girls, 2-20 Years) data.      Temp Readings from Last 3 Encounters:   06/19/19 98.3 °F (36.8 °C)   06/19/19 97.9 °F (36.6 °C)   10/26/18 98.1 °F (36.7 °C)     BP Readings from Last 3 Encounters:   06/19/19 90/50   06/19/19 114/55   10/26/18 120/66     Pulse Readings from Last 3 Encounters:   06/19/19 (!) 53   06/19/19 86   10/26/18 75            DATA     LABORATORY DATA:  Labs Reviewed - No data to display  Admission on 06/18/2019, Discharged on 06/19/2019   Component Date Value Ref Range Status    Color 06/18/2019 YELLOW    Final    Appearance 06/18/2019 CLOUDY    Final    Specific gravity 06/18/2019 1.024  1.005 - 1.030   Final    pH (UA) 06/18/2019 5.0  5.0 - 8.0   Final    Protein 06/18/2019 NEGATIVE   NEG mg/dL Final    Glucose 06/18/2019 NEGATIVE   NEG mg/dL Final    Ketone 06/18/2019 NEGATIVE   NEG mg/dL Final    Bilirubin 06/18/2019 NEGATIVE   NEG   Final    Blood 06/18/2019 NEGATIVE   NEG   Final    Urobilinogen 06/18/2019 1.0  0.2 - 1.0 EU/dL Final    Nitrites 06/18/2019 NEGATIVE   NEG   Final    Leukocyte Esterase 06/18/2019 SMALL* NEG   Final    BENZODIAZEPINES 06/18/2019 NEGATIVE   NEG   Final    BARBITURATES 06/18/2019 NEGATIVE   NEG   Final    THC (TH-CANNABINOL) 06/18/2019 NEGATIVE   NEG   Final    OPIATES 06/18/2019 NEGATIVE   NEG   Final    PCP(PHENCYCLIDINE) 06/18/2019 NEGATIVE   NEG   Final    COCAINE 06/18/2019 NEGATIVE   NEG   Final    AMPHETAMINES 06/18/2019 NEGATIVE   NEG   Final    METHADONE 06/18/2019 NEGATIVE   NEG   Final    HDSCOM 06/18/2019 (NOTE)   Final    WBC 06/18/2019 6.8  4.6 - 13.2 K/uL Final    RBC 06/18/2019 4.28  4.20 - 5.30 M/uL Final    HGB 06/18/2019 13.3  12.0 - 16.0 g/dL Final    HCT 06/18/2019 38.9  35.0 - 45.0 % Final    MCV 06/18/2019 90.9  74.0 - 97.0 FL Final    MCH 06/18/2019 31.1  24.0 - 34.0 PG Final    MCHC 06/18/2019 34.2  31.0 - 37.0 g/dL Final    RDW 06/18/2019 11.7  11.6 - 14.5 % Final    PLATELET 58/44/4074 698  135 - 420 K/uL Final    MPV 06/18/2019 10.2  9.2 - 11.8 FL Final    NEUTROPHILS 06/18/2019 45  40 - 73 % Final    LYMPHOCYTES 06/18/2019 46  21 - 52 % Final    MONOCYTES 06/18/2019 7  3 - 10 % Final    EOSINOPHILS 06/18/2019 2  0 - 5 % Final    BASOPHILS 06/18/2019 0  0 - 2 % Final    ABS. NEUTROPHILS 06/18/2019 3.1  1.8 - 8.0 K/UL Final    ABS. LYMPHOCYTES 06/18/2019 3.1  0.9 - 3.6 K/UL Final    ABS.  MONOCYTES 06/18/2019 0.5  0.05 - 1.2 K/UL Final    ABS. EOSINOPHILS 06/18/2019 0.1  0.0 - 0.4 K/UL Final    ABS. BASOPHILS 06/18/2019 0.0  0.0 - 0.1 K/UL Final    DF 06/18/2019 AUTOMATED    Final    Sodium 06/18/2019 144  136 - 145 mmol/L Final    Potassium 06/18/2019 4.0  3.5 - 5.5 mmol/L Final    Chloride 06/18/2019 109* 100 - 108 mmol/L Final    CO2 06/18/2019 26  21 - 32 mmol/L Final    Anion gap 06/18/2019 9  3.0 - 18 mmol/L Final    Glucose 06/18/2019 124* 74 - 99 mg/dL Final    BUN 06/18/2019 15  7.0 - 18 MG/DL Final    Creatinine 06/18/2019 1.14  0.6 - 1.3 MG/DL Final    BUN/Creatinine ratio 06/18/2019 13  12 - 20   Final    GFR est AA 06/18/2019 >60  >60 ml/min/1.73m2 Final    GFR est non-AA 06/18/2019 >60  >60 ml/min/1.73m2 Final    Calcium 06/18/2019 8.8  8.5 - 10.1 MG/DL Final    Acetaminophen level 06/18/2019 <2* 10.0 - 30.0 ug/mL Final    Salicylate level 46/76/6113 <1.7* 2.8 - 20.0 MG/DL Final    ALCOHOL(ETHYL),SERUM 06/18/2019 <3  0 - 3 MG/DL Final    WBC 06/18/2019 0 to 3  0 - 5 /hpf Final    RBC 06/18/2019 NEGATIVE   0 - 5 /hpf Final    Epithelial cells 06/18/2019 1+  0 - 5 /lpf Final    Bacteria 06/18/2019 1+* NEG /hpf Final    Pregnancy test,urine (POC) 06/18/2019 NEGATIVE   NEG   Final        RADIOLOGY REPORTS:  Results from Hospital Encounter encounter on 09/01/17   XR CHEST PA LAT    Narrative EXAM:PA and lateral radiographs of the chest    INDICATION: Shortness of breath    COMPARISON: None.    _______________    FINDINGS:    Normal heart size and mediastinal contour. No consolidation, pleural effusion,  or pneumothorax. No acute osseous abnormalities. _______________      Impression IMPRESSION:    No acute pulmonary findings   Ct Head Wo Cont    Result Date: 6/18/2019  EXAM: CT head CLINICAL INDICATION/HISTORY: visual hallucinations, headache   > Additional: None. COMPARISON: None. > Reference Exam: None.  TECHNIQUE: Axial CT imaging of the head was performed without intravenous contrast. Sagittal and coronal reconstructions were performed. One or more dose reduction techniques were used on this CT: automated exposure control, adjustment of the mAs and/or kVp according to patient size, and iterative reconstruction techniques. The specific techniques used on this CT exam have been documented in the patient's electronic medical record. Digital Imaging and Communications in Medicine (DICOM) format image data are available to nonaffiliated external healthcare facilities or entities on a secure, media free, reciprocally searchable basis with patient authorization for at least a 12-month period after this study. _______________ FINDINGS: BRAIN AND POSTERIOR FOSSA: The sulci, folia, ventricles and basal cisterns are within normal limits for the patient's age. No intracranial hemorrhage. Prominent right retrocerebellar CSF structure measuring 1.8 x 1.5 cm. Possibility of communication with the fourth ventricle. There are no areas of abnormal parenchymal attenuation. EXTRA-AXIAL SPACES AND MENINGES: There are no abnormal extra-axial fluid collections. CALVARIUM: Intact. SINUSES: Mild bilateral maxillary sinus disease. OTHER: None. _______________     IMPRESSION: 1. No acute intracranial abnormalities.   2. Prominent right retrocerebellar CSF structure (1.8 cm), favor arachnoid cyst.             MEDICATIONS       ALL MEDICATIONS  Current Facility-Administered Medications   Medication Dose Route Frequency    albuterol (PROVENTIL HFA, VENTOLIN HFA, PROAIR HFA) inhaler 2 Puff  2 Puff Inhalation Q4H PRN    ziprasidone (GEODON) 20 mg in sterile water (preservative free) 1 mL injection  20 mg IntraMUSCular BID PRN    OLANZapine (ZyPREXA) tablet 5 mg  5 mg Oral Q6H PRN    benztropine (COGENTIN) tablet 2 mg  2 mg Oral BID PRN    benztropine (COGENTIN) injection 2 mg  2 mg IntraMUSCular BID PRN    LORazepam (ATIVAN) injection 2 mg  2 mg IntraMUSCular Q4H PRN    acetaminophen (TYLENOL) tablet 650 mg 650 mg Oral Q4H PRN    ibuprofen (MOTRIN) tablet 400 mg  400 mg Oral Q8H PRN    magnesium hydroxide (MILK OF MAGNESIA) 400 mg/5 mL oral suspension 30 mL  30 mL Oral DAILY PRN    nicotine (NICODERM CQ) 21 mg/24 hr patch 1 Patch  1 Patch TransDERmal DAILY PRN    traZODone (DESYREL) tablet 50 mg  50 mg Oral QHS PRN    hydrOXYzine HCl (ATARAX) tablet 50 mg  50 mg Oral Q6H PRN      SCHEDULED MEDICATIONS  Current Facility-Administered Medications   Medication Dose Route Frequency                ASSESSMENT & PLAN        The patient, Lambert Calloway, is a 20 y.o.  female who presents at this time for treatment of the following diagnoses:  Patient Active Hospital Problem List:   Depression (6/19/2019)    Assessment: Adult female with acute psychosis after exposure to bleach chemicals    Plan: Admit for safety and stabilization  Medication modification as needed  Group therapy  Disposition planning with social work for tomorrow       A coordinated, multidisplinary treatment team (includes the nurse, unit pharmcist,  and writer) round was conducted for this initial evaluation with the patient present. The following regarding medications was addressed during rounds with patient: the risks and benefits of the proposed medication. The patient was given the opportunity to ask questions. Informed consent given to the use of the above medications. I have reviewed admission (and previous/old) labs and medical tests in the EHR and or transferring hospital documents. I will continue to order blood tests/labs and diagnostic tests as deemed appropriate and review results as they become available (see orders for details). The team will gather additional collateral information from friends, family and o/p treatment team to further elucidate the nature of patient's psychopathology and baselline level of psychiatric functioning.       ESTIMATED LENGTH OF STAY:    1-2 days       STRENGTHS:  Setting and pursuing goals and Realizes one's potential                                        SIGNED:    Walter Salgado MD  6/20/2019

## 2019-06-20 NOTE — BH NOTES
1900 Received report from Alejandro Rkp. 97. and oriented. Resting comfortably and reading in room  2345 Appears to be sleeping at this time. 0600 Has Appeared to sleep ~ 6 hours this shift. Hourly nursing rounds done per protocol.

## 2019-06-20 NOTE — BH NOTES
2300 Report rec'd from [address] [place] [address] Resting in bed reading. Denies AVH or distress at this time. . No distress apparent. 0130 Appears to be sleeping  0615 Has appeared to sleep ~6 hours this shift.   Hourly rounds done by RN

## 2019-06-20 NOTE — PROGRESS NOTES
[facility] Medication Reconciliation     The following changes were made to the PTA medication list:   Additions:   Olanzapine  Albuterol  Modifications:   Trazodone dose changed from 100 mg to 50 mg  Deletions:   Escitalopram    Total Time Spent: 10 minutes    Information obtained from: RxQuery, patient interview    Patient allergies: Allergies as of 06/19/2019    (No Known Allergies)       Prior to Admission Medications   Prescriptions Last Dose Informant Patient Reported? Taking? FLUoxetine (PROZAC) 20 mg capsule  Other Yes Yes   Sig: Take 20 mg by mouth nightly. Indications: major depressive disorder   OLANZapine (ZYPREXA) 2.5 mg tablet  Other Yes Yes   Sig: Take 2.5 mg by mouth nightly. Indications: Additional Medications to Treat Depression   albuterol (PROAIR HFA) 90 mcg/actuation inhaler  Other Yes Yes   Sig: Take 2 Puffs by inhalation every four (4) hours as needed for Wheezing or Shortness of Breath. traZODone (DESYREL) 50 mg tablet  Other Yes Yes   Sig: Take 50 mg by mouth nightly.  Indications: insomnia associated with depression      Facility-Administered Medications: None        Thank you,  Michael Hartley,  Hayley Mcdaniel, [facility]  Desk: [phone] ([id number]  Pharmacy: [phone] ([id number])

## 2019-06-20 NOTE — BH NOTES
GROUP THERAPY PROGRESS NOTE    The patient Kali smith 20 y.o. female is participating in Creative Expression Group. Group time: 1 hour    Personal goal for participation:  To concentrate on selected task    Goal orientation: social    Group therapy participation: minimal    Therapeutic interventions reviewed and discussed: Crafts, games, music    Impression of participation: The patient was present-arrived late    Cesilia Tipton  6/20/2019  6:47 PM

## 2019-06-21 RX ORDER — OLANZAPINE 2.5 MG/1
2.5 TABLET ORAL
Qty: 30 TAB | Refills: 0 | Status: SHIPPED | OUTPATIENT
Start: 2019-06-21 | End: 2019-06-21 | Stop reason: SDUPTHER

## 2019-06-21 RX ORDER — ALBUTEROL SULFATE 90 UG/1
2 AEROSOL, METERED RESPIRATORY (INHALATION)
Qty: 1 INHALER | Refills: 0 | Status: SHIPPED | OUTPATIENT
Start: 2019-06-21 | End: 2019-06-21 | Stop reason: SDUPTHER

## 2019-06-21 RX ORDER — TRAZODONE HYDROCHLORIDE 50 MG/1
50 TABLET ORAL
Status: DISCONTINUED | OUTPATIENT
Start: 2019-06-21 | End: 2019-06-21 | Stop reason: HOSPADM

## 2019-06-21 RX ORDER — TRAZODONE HYDROCHLORIDE 50 MG/1
50 TABLET ORAL
Qty: 30 TAB | Refills: 0 | Status: SHIPPED | OUTPATIENT
Start: 2019-06-21 | End: 2019-06-21 | Stop reason: SDUPTHER

## 2019-06-21 RX ORDER — OLANZAPINE 2.5 MG/1
2.5 TABLET ORAL
Qty: 30 TAB | Refills: 0 | Status: SHIPPED | OUTPATIENT
Start: 2019-06-21

## 2019-06-21 RX ORDER — FLUOXETINE HYDROCHLORIDE 20 MG/1
20 CAPSULE ORAL
Qty: 30 CAP | Refills: 0 | Status: SHIPPED | OUTPATIENT
Start: 2019-06-21

## 2019-06-21 RX ORDER — TRAZODONE HYDROCHLORIDE 50 MG/1
50 TABLET ORAL
Qty: 30 TAB | Refills: 0 | Status: SHIPPED | OUTPATIENT
Start: 2019-06-21

## 2019-06-21 RX ORDER — OLANZAPINE 2.5 MG/1
2.5 TABLET ORAL
Status: DISCONTINUED | OUTPATIENT
Start: 2019-06-21 | End: 2019-06-21 | Stop reason: HOSPADM

## 2019-06-21 RX ORDER — ALBUTEROL SULFATE 90 UG/1
2 AEROSOL, METERED RESPIRATORY (INHALATION)
Qty: 1 INHALER | Refills: 0 | Status: SHIPPED | OUTPATIENT
Start: 2019-06-21

## 2019-06-21 RX ORDER — FLUOXETINE HYDROCHLORIDE 20 MG/1
20 CAPSULE ORAL
Qty: 30 CAP | Refills: 0 | Status: SHIPPED | OUTPATIENT
Start: 2019-06-21 | End: 2019-06-21 | Stop reason: SDUPTHER

## 2019-06-21 RX ORDER — FLUOXETINE HYDROCHLORIDE 20 MG/1
20 CAPSULE ORAL
Status: DISCONTINUED | OUTPATIENT
Start: 2019-06-21 | End: 2019-06-21 | Stop reason: HOSPADM

## 2019-06-21 NOTE — BH NOTES
Report received from [facility] ELSY, RN at 0700. Patient currently reading in bed. Patient stated she slept well last night and ready for discharge today. Patient does not receive any medication. Patient remains isolative to room and comes out only for meals. However, does socialize with roommate while in the room. 9840  Patient asleep in bed    1005  Patient discharged to home in [place], [place] with brother. Patient given and signed for all belongings and valuables. Patient given all discharge instruction including follow up appointments and prescription information. Patient verbalized understanding.

## 2019-06-21 NOTE — DISCHARGE INSTRUCTIONS
Patient Education      If I feel I am at risk of hurting myself or others, I will call the crisis office and speak with a crisis worker who will assist me during my crisis. [address]  [phone]  [facility] [place] [address] [phone]  [address]  [phone]    Learning About Depression Screening  What is depression screening? Depression screening is a way to see if you have depression symptoms. It may be done by a doctor or counselor. This screening is often part of a routine checkup. That's because your mental health is just as important as your physical health. Depression is a medical illness that affects how you feel, think, and act. You may:  · Have less energy. · Lose interest in your daily activities. · Feel sad and grouchy for a long time. Depression is very common. It affects men and women of all ages. Many things can trigger depression. Some people become depressed after they have a stroke or find out they have a major illness like cancer or heart disease. The death of a loved one, a breakup, or changes in the natural brain chemicals may lead to depression. It can run in families. Most experts believe that a combination of family history (a person's genes) and stressful life events can cause depression. What happens during screening? Your doctor may ask about your feelings, any changes in eating habits, your energy level, and your interest in your daily tasks. He or she may ask other questions, such as how well you are sleeping and if you can focus on the things you do. This may be an informal talk between the two of you. Or your doctor may ask you to fill out a quick form and then talk about your answers. Some diseases or changes in your body can cause symptoms that look like depression.  So your doctor may do blood tests to help rule out other problems, such as hormone changes, a low thyroid level, or anemia. What happens after screening? If you have signs of depression, your doctor will talk to you about your options. Doctors usually treat depression with medicines or counseling. Often, combining the two works best. Many people don't get help because they think that they'll get over the depression on their own. But people with depression may not get better unless they get treatment. Many people feel embarrassed or ashamed about having depression. But it isn't a sign of personal weakness. It's not a character flaw. A person who is depressed is not \"crazy. \" Depression is caused by changes in the brain. A serious symptom of depression is thinking about death or suicide. If you or someone you care about talks about this or about feeling hopeless, get help right away. It's important to know that depression can be treated. The first step toward feeling better is often just seeing that the problem exists. Where can you learn more? Go to http://aiden-emmy.info/. Enter T185 in the search box to learn more about \"Learning About Depression Screening. \"  Current as of: September 11, 2018  Content Version: 11.9  © 4369-9363 [employer]. Care instructions adapted under license by [employer] (which disclaims liability or warranty for this information). If you have questions about a medical condition or this instruction, always ask your healthcare professional. [place] [address] any warranty or liability for your use of this information.

## 2019-06-21 NOTE — DISCHARGE SUMMARY
PSYCHIATRIC DISCHARGE SUMMARY         IDENTIFICATION:    Patient Name  Esperanza Agustin   Date of Birth 2000   [facility] [id number]   Medical Record Number  [id number]      Age  20 y.o. PCP None   Admit date:  6/19/2019    Discharge date: 6/21/2019   Room Number  320/02  @ [facility]   Date of Service  6/21/2019            TYPE OF DISCHARGE: REGULAR               CONDITION AT DISCHARGE: improved       PROVISIONAL & DISCHARGE DIAGNOSES:    Problem List  Never Reviewed          Codes Class    Depression ICD-10-CM: F32.9  ICD-9-CM: [address] Problems    Depression        DISCHARGE DIAGNOSIS:   Axis I:  SEE ABOVE  Axis II: SEE ABOVE  Axis III: SEE ABOVE  Axis IV:  lack of structure  Axis V:  <50 on admission, 55+ on discharge     CC & HISTORY OF PRESENT ILLNESS:   20 y.o. WHITE OR  female with a past psychiatric history significant for depression, who presents at this time with complaints of (and/or evidence of) the following emotional symptoms: depression, psychosis and seeing and hearing things. Additional symptomatology include was stress and anxiety, that have been present for days. These symptoms are of moderate severity and are intermittent/ fleeting in nature. Precipitants include exposure to high concentrations of bleach cleanser and psychosocial stressors (preparing to moving). Patient's condition made worse by recent medication additions where she missed one dose of zyprexa for  treatment noncompliance.  UDS: negative; BAL=0.      SOCIAL HISTORY:    Social History     Socioeconomic History    Marital status: SINGLE     Spouse name: Not on file    Number of children: Not on file    Years of education: Not on file    Highest education level: Not on file   Occupational History    Not on file   Social Needs    Financial resource strain: Not on file    Food insecurity:     Worry: Not on file     Inability: Not on file    Transportation needs:     Medical: Not on file     Non-medical: Not on file   Tobacco Use    Smoking status: Never Smoker    Smokeless tobacco: Never Used   Substance and Sexual Activity    Alcohol use: No    Drug use: No    Sexual activity: Not on file   Lifestyle    Physical activity:     Days per week: Not on file     Minutes per session: Not on file    Stress: Not on file   Relationships    Social connections:     Talks on phone: Not on file     Gets together: Not on file     Attends Islam service: Not on file     Active member of club or organization: Not on file     Attends meetings of clubs or organizations: Not on file     Relationship status: Not on file    Intimate partner violence:     Fear of current or ex partner: Not on file     Emotionally abused: Not on file     Physically abused: Not on file     Forced sexual activity: Not on file   Other Topics Concern    Not on file   Social History Narrative    Not on file      FAMILY HISTORY:   No family history on file. HOSPITALIZATION COURSE:    Ghanshyam Black was admitted to the inpatient psychiatric unit [facility] for acute psychiatric stabilization in regards to symptomatology as described in the HPI above. The differential diagnosis at time of admission included: schizophrenia vs substance induced psychotic disorder schizoaffective vs bipolar MDD vs adjustment disorder. While on the unit Ghanshyam Black was involved in individual, group, occupational and milieu therapy. Psychiatric medications were adjusted during this hospitalization. Ghanshyam Black demonstrated a progressive improvement in overall condition. Much of patient's initial presentation appeared to be related to situational stressors, effects of medication non-compliance  and psychological factors. Please see individual progress notes for more specific details regarding patient's hospitalization course. Cindy Eduar reports feeling well and moods are good. Denies SI/HI/AH/VH.   No aggression or violence. Appropriately interactive and aware. Tolerating medications well. Eating and sleeping fairly. Requesting to leave today. There are no grounds to seek a TDO. At time of discharge, Naheed Crawford is without significant problems of depression, psychosis, or obdulio. Patient free of suicidal and homicidal ideations (appears to be at very low risk of suicide or homicide) and reports many positive predictive factors in terms of not attempting suicide or homicide. Overall presentation at time of discharge is most consistent with the diagnosis of Substance induced psychosis, MDD rec unspecified. Patient has maximized benefit to be derived from acute inpatient psychiatric treatment. All members of the treatment team concur with each other in regards to plans for discharge today. Patient and family are aware and in agreement with discharge and discharge plan.          LABS AND IMAGAING:    Labs Reviewed - No data to display  No results found for: DS35, PHEN, PHENO, PHENT, DILF, DS39, PHENY, PTN, VALF2, VALAC, VALP, VALPR, DS6, CRBAM, CRBAMP, CARB2, XCRBAM  Admission on 06/18/2019, Discharged on 06/19/2019   Component Date Value Ref Range Status    Color 06/18/2019 YELLOW    Final    Appearance 06/18/2019 CLOUDY    Final    Specific gravity 06/18/2019 1.024  1.005 - 1.030   Final    pH (UA) 06/18/2019 5.0  5.0 - 8.0   Final    Protein 06/18/2019 NEGATIVE   NEG mg/dL Final    Glucose 06/18/2019 NEGATIVE   NEG mg/dL Final    Ketone 06/18/2019 NEGATIVE   NEG mg/dL Final    Bilirubin 06/18/2019 NEGATIVE   NEG   Final    Blood 06/18/2019 NEGATIVE   NEG   Final    Urobilinogen 06/18/2019 1.0  0.2 - 1.0 EU/dL Final    Nitrites 06/18/2019 NEGATIVE   NEG   Final    Leukocyte Esterase 06/18/2019 SMALL* NEG   Final    BENZODIAZEPINES 06/18/2019 NEGATIVE   NEG   Final    BARBITURATES 06/18/2019 NEGATIVE   NEG   Final    THC (TH-CANNABINOL) 06/18/2019 NEGATIVE   NEG   Final    OPIATES 06/18/2019 NEGATIVE   NEG   Final    PCP(PHENCYCLIDINE) 06/18/2019 NEGATIVE   NEG   Final    COCAINE 06/18/2019 NEGATIVE   NEG   Final    AMPHETAMINES 06/18/2019 NEGATIVE   NEG   Final    METHADONE 06/18/2019 NEGATIVE   NEG   Final    HDSCOM 06/18/2019 (NOTE)   Final    WBC 06/18/2019 6.8  4.6 - 13.2 K/uL Final    RBC 06/18/2019 4.28  4.20 - 5.30 M/uL Final    HGB 06/18/2019 13.3  12.0 - 16.0 g/dL Final    HCT 06/18/2019 38.9  35.0 - 45.0 % Final    MCV 06/18/2019 90.9  74.0 - 97.0 FL Final    MCH 06/18/2019 31.1  24.0 - 34.0 PG Final    MCHC 06/18/2019 34.2  31.0 - 37.0 g/dL Final    RDW 06/18/2019 11.7  11.6 - 14.5 % Final    PLATELET 71/63/2015 602  135 - 420 K/uL Final    MPV 06/18/2019 10.2  9.2 - 11.8 FL Final    NEUTROPHILS 06/18/2019 45  40 - 73 % Final    LYMPHOCYTES 06/18/2019 46  21 - 52 % Final    MONOCYTES 06/18/2019 7  3 - 10 % Final    EOSINOPHILS 06/18/2019 2  0 - 5 % Final    BASOPHILS 06/18/2019 0  0 - 2 % Final    ABS. NEUTROPHILS 06/18/2019 3.1  1.8 - 8.0 K/UL Final    ABS. LYMPHOCYTES 06/18/2019 3.1  0.9 - 3.6 K/UL Final    ABS. MONOCYTES 06/18/2019 0.5  0.05 - 1.2 K/UL Final    ABS. EOSINOPHILS 06/18/2019 0.1  0.0 - 0.4 K/UL Final    ABS.  BASOPHILS 06/18/2019 0.0  0.0 - 0.1 K/UL Final    DF 06/18/2019 AUTOMATED    Final    Sodium 06/18/2019 144  136 - 145 mmol/L Final    Potassium 06/18/2019 4.0  3.5 - 5.5 mmol/L Final    Chloride 06/18/2019 109* 100 - 108 mmol/L Final    CO2 06/18/2019 26  21 - 32 mmol/L Final    Anion gap 06/18/2019 9  3.0 - 18 mmol/L Final    Glucose 06/18/2019 124* 74 - 99 mg/dL Final    BUN 06/18/2019 15  7.0 - 18 MG/DL Final    Creatinine 06/18/2019 1.14  0.6 - 1.3 MG/DL Final    BUN/Creatinine ratio 06/18/2019 13  12 - 20   Final    GFR est AA 06/18/2019 >60  >60 ml/min/1.73m2 Final    GFR est non-AA 06/18/2019 >60  >60 ml/min/1.73m2 Final    Calcium 06/18/2019 8.8  8.5 - 10.1 MG/DL Final    Acetaminophen level 06/18/2019 <2* 10.0 - 30.0 ug/mL Final    Salicylate level 67/84/6423 <1.7* 2.8 - 20.0 MG/DL Final    ALCOHOL(ETHYL),SERUM 06/18/2019 <3  0 - 3 MG/DL Final    WBC 06/18/2019 0 to 3  0 - 5 /hpf Final    RBC 06/18/2019 NEGATIVE   0 - 5 /hpf Final    Epithelial cells 06/18/2019 1+  0 - 5 /lpf Final    Bacteria 06/18/2019 1+* NEG /hpf Final    Pregnancy test,urine (POC) 06/18/2019 NEGATIVE   NEG   Final     Ct Head Wo Cont    Result Date: 6/18/2019  EXAM: CT head CLINICAL INDICATION/HISTORY: visual hallucinations, headache   > Additional: None. COMPARISON: None. > Reference Exam: None. TECHNIQUE: Axial CT imaging of the head was performed without intravenous contrast. Sagittal and coronal reconstructions were performed. One or more dose reduction techniques were used on this CT: automated exposure control, adjustment of the mAs and/or kVp according to patient size, and iterative reconstruction techniques. The specific techniques used on this CT exam have been documented in the patient's electronic medical record. Digital Imaging and Communications in Medicine (DICOM) format image data are available to nonaffiliated external healthcare facilities or entities on a secure, media free, reciprocally searchable basis with patient authorization for at least a 12-month period after this study. _______________ FINDINGS: BRAIN AND POSTERIOR FOSSA: The sulci, folia, ventricles and basal cisterns are within normal limits for the patient's age. No intracranial hemorrhage. Prominent right retrocerebellar CSF structure measuring 1.8 x 1.5 cm. Possibility of communication with the fourth ventricle. There are no areas of abnormal parenchymal attenuation. EXTRA-AXIAL SPACES AND MENINGES: There are no abnormal extra-axial fluid collections. CALVARIUM: Intact. SINUSES: Mild bilateral maxillary sinus disease. OTHER: None. _______________     IMPRESSION: 1. No acute intracranial abnormalities.   2. Prominent right retrocerebellar CSF structure (1.8 cm), favor arachnoid cyst.                  DISPOSITION:    Home. Patient to f/u with psychiatric, and psychotherapy appointments. Patient is to f/u with internist as directed. FOLLOW-UP CARE:    Activity as tolerated  Regular diet  Wound Care: none needed. Follow-up Information     Follow up With Specialties Details Why [facility] [place] Service [facility]    Pt will follow-up upon her move to [address] [place] [address] Pradeep Cedeno [place], [address]  Phone: [phone]    None    None ([phone]) Patient stated that they have no PCP                   PROGNOSIS:   Fair ---- based on nature of patient's pathology/ies and treatment compliance issues. Prognosis is greatly dependent upon patient's ability to remain sober and to follow up with scheduled appointments as well as to comply with psychiatric medications as prescribed. DISCHARGE MEDICATIONS:     Informed consent given for the use of following psychotropic medications:  Current Discharge Medication List      CONTINUE these medications which have CHANGED    Details   FLUoxetine (PROZAC) 20 mg capsule Take 1 Cap by mouth nightly. Indications: major depressive disorder  Qty: 30 Cap, Refills: 0      OLANZapine (ZYPREXA) 2.5 mg tablet Take 1 Tab by mouth nightly. Indications: Additional Medications to Treat Depression  Qty: 30 Tab, Refills: 0      traZODone (DESYREL) 50 mg tablet Take 1 Tab by mouth nightly. Indications: insomnia associated with depression  Qty: 30 Tab, Refills: 0      albuterol (PROAIR HFA) 90 mcg/actuation inhaler Take 2 Puffs by inhalation every four (4) hours as needed for Wheezing or Shortness of Breath. Indications: bronchospasm prevention  Qty: 1 Inhaler, Refills: 0                    A coordinated, multidisplinary treatment team round was conducted with Nelwyn Kussmaul is done daily here at [facility]. This team consists of the nurse, psychiatric unit pharmacist,  and Stvee Mallory.      I have spent greater than 35 minutes on discharge work.     Signed:  Vern Kimball MD  6/21/2019

## 2019-06-21 NOTE — PROGRESS NOTES
Pharmacist Discharge Medication Reconciliation    Discharging Provider: Dr. Bentley Dolan    Chief Complaint for this Admission: No chief complaint on file. Allergies: Patient has no known allergies. Discharge Medications:   Current Discharge Medication List        CONTINUE these medications which have CHANGED    Details   FLUoxetine (PROZAC) 20 mg capsule Take 1 Cap by mouth nightly. Indications: major depressive disorder  Qty: 30 Cap, Refills: 0      OLANZapine (ZYPREXA) 2.5 mg tablet Take 1 Tab by mouth nightly. Indications: Additional Medications to Treat Depression  Qty: 30 Tab, Refills: 0      traZODone (DESYREL) 50 mg tablet Take 1 Tab by mouth nightly. Indications: insomnia associated with depression  Qty: 30 Tab, Refills: 0      albuterol (PROAIR HFA) 90 mcg/actuation inhaler Take 2 Puffs by inhalation every four (4) hours as needed for Wheezing or Shortness of Breath.  Indications: bronchospasm prevention  Qty: 1 Inhaler, Refills: 0             The patient's chart, MAR and AVS were reviewed by Juan Galicia, TESSYD, BCPS

## 2019-07-09 NOTE — PROGRESS NOTES
EMR entered and reviewed for the purpose of chart review in the course of performing educational functions and responsibilities related to performance improvement.'